# Patient Record
Sex: FEMALE | Race: WHITE | NOT HISPANIC OR LATINO | URBAN - NONMETROPOLITAN AREA
[De-identification: names, ages, dates, MRNs, and addresses within clinical notes are randomized per-mention and may not be internally consistent; named-entity substitution may affect disease eponyms.]

---

## 2019-06-05 ENCOUNTER — IMPORTED ENCOUNTER (OUTPATIENT)
Dept: URBAN - NONMETROPOLITAN AREA CLINIC 1 | Facility: CLINIC | Age: 74
End: 2019-06-05

## 2019-06-05 PROCEDURE — 99213 OFFICE O/P EST LOW 20 MIN: CPT

## 2019-06-05 PROCEDURE — 92134 CPTRZ OPH DX IMG PST SGM RTA: CPT

## 2019-06-05 NOTE — PATIENT DISCUSSION
ARMD OU-  discussed findings w/patient-  start PreserVision AREDS 2 PO daily-  continue AG use daily Patient to call or come in if changes in vision are noted from today-  OCT Mac done today OD&OS: hard drusen and RPE changes-  monitor 6 month Complete w/ Fundus PhotosType II DM w/o Retinopathy (Dx 2013)-  discussed findings w/patient-  no retinopathy noted at this time-  condition is controlled with oral medication-  last A1C 5.7 this year -  reviewed the importance of good blood sugar control and the negative effects of poorly controlled sugars on ocular health-  will send notes from today to Dr. Shine Copeland-  monitor 6 months or prn Pseudophakia w/ PCO OU-  discussed findings w/patient-  PCO note OU no treatment indicated -  monitor 6 months or prn ABILIO OU-  discussed findings w/patient-  continue tears PRN OU -  monitor yearly or prn; Michaela Notes: steroid responderMRDFE 6/5/2019OCT Mac 6/5/2019

## 2019-09-10 PROBLEM — H26.492: Noted: 2019-06-05

## 2019-09-10 PROBLEM — H35.3131: Noted: 2019-06-05

## 2019-09-10 PROBLEM — Z96.1: Noted: 2019-06-05

## 2019-09-10 PROBLEM — H16.223: Noted: 2019-09-10

## 2019-09-10 PROBLEM — E11.9: Noted: 2019-06-05

## 2019-09-10 PROBLEM — H52.223: Noted: 2019-06-05

## 2020-01-15 ENCOUNTER — IMPORTED ENCOUNTER (OUTPATIENT)
Dept: URBAN - NONMETROPOLITAN AREA CLINIC 1 | Facility: CLINIC | Age: 75
End: 2020-01-15

## 2020-01-15 PROCEDURE — 92014 COMPRE OPH EXAM EST PT 1/>: CPT

## 2020-01-15 PROCEDURE — 92015 DETERMINE REFRACTIVE STATE: CPT

## 2020-01-15 PROCEDURE — 92134 CPTRZ OPH DX IMG PST SGM RTA: CPT

## 2020-01-15 NOTE — PATIENT DISCUSSION
Mild ARMD OU dry -  discussed findings w/patient-  start PreserVision AREDS 2 PO daily-  continue AG use daily Patient to call or come in if changes in vision are noted from today-  UV protection recommended-  recommend leafy green vegetables such as kale spinach collards-  OCT Mac done today 1/15/2020: OD&OS: hard drusen sparse soft drusen and RPE changes.  Stable from previous-  monitor 6 month f/u ARMD w/ OCT Mac Type II DM w/o Retinopathy (Dx 2005)-  discussed findings w/patient-  no retinopathy noted at this time stable -  condition is controlled with oral medication-  last A1C 6.0% 10/2019.-  reviewed the importance of good blood sugar control and the negative effects of poorly controlled sugars on ocular health-  will send notes from today to Dr. Daniel Blanco-  monitor 6 month f/u Pseudophakia w/ PCO OU-  discussed findings w/patient-  PCO note OU no treatment indicated -  monitor 6 months or prn ABILIO OU-  discussed findings w/patient-  continue tears PRN OU -  monitor yearly or prn Mixed Astigmatism OU w/Presbyopia-  discussed findings w/patient-  new spectacle Rx issued-  monitor yearly or prn; 's Notes: steroid responderMR 1/15/2020DFE 1/15/2020OCT Mac 1/15/2020

## 2020-07-22 ENCOUNTER — IMPORTED ENCOUNTER (OUTPATIENT)
Dept: URBAN - NONMETROPOLITAN AREA CLINIC 1 | Facility: CLINIC | Age: 75
End: 2020-07-22

## 2020-07-22 PROCEDURE — 92134 CPTRZ OPH DX IMG PST SGM RTA: CPT

## 2020-07-22 PROCEDURE — 99213 OFFICE O/P EST LOW 20 MIN: CPT

## 2020-07-22 NOTE — PATIENT DISCUSSION
Mild ARMD OU dry -  discussed findings w/patient-  start PreserVision AREDS 2 PO daily-  continue AG use daily Patient to call or come in if changes in vision are noted from today-  UV protection recommended-  recommend leafy green vegetables such as kale spinach collards-  OCT Mac done today 7/22/2020: OD&OS: hard drusen sparse soft drusen and RPE changes.  Stable from previous-  monitor 6 month f/u ARMD w/ OCT Mac Type II DM w/o Retinopathy (Dx 2005)-  discussed findings w/patient-  no retinopathy noted at this time stable -  condition is controlled with oral medication-  last A1C 6.0% 10/2019.-  reviewed the importance of good blood sugar control and the negative effects of poorly controlled sugars on ocular health-  will send notes from today to Dr. Salome Lopez-  monitor 6 month f/u Pseudophakia w/ PCO OU-  discussed findings w/patient-  PCO note OU no treatment indicated -  monitor 6 months or prn ABILIO OU-  discussed findings w/patient-  continue tears PRN OU -  monitor yearly or prn; Michaela Notes: steroid responderMR 1/15/2020DFE defer 7/22/2020OCT Mac 7/22/2020

## 2021-01-20 ENCOUNTER — IMPORTED ENCOUNTER (OUTPATIENT)
Dept: URBAN - NONMETROPOLITAN AREA CLINIC 1 | Facility: CLINIC | Age: 76
End: 2021-01-20

## 2021-01-20 PROCEDURE — 92134 CPTRZ OPH DX IMG PST SGM RTA: CPT

## 2021-01-20 PROCEDURE — 92015 DETERMINE REFRACTIVE STATE: CPT

## 2021-01-20 PROCEDURE — 92014 COMPRE OPH EXAM EST PT 1/>: CPT

## 2021-01-20 NOTE — PATIENT DISCUSSION
Mild ARMD OU dry -  discussed findings w/patient-  start PreserVision AREDS 2 PO daily-  continue AG use daily Patient to call or come in if changes in vision are noted from today-  UV protection recommended-  recommend leafy green vegetables such as kale spinach collards-  OCT Mac done 1/20/2021:     OD&OS: mild hard drusen sparse soft drusen and RPE changes.  Stable from     previous-  monitor 6 month f/u ARMD w/OCT Mac Type II DM w/o Retinopathy (Dx 2005)-  discussed findings w/patient-  no retinopathy noted at this time stable -  condition is controlled with oral medication-  last A1C 6.0% Decmeber 2020-  BS not checked daily-  reviewed the importance of good blood sugar control and the negative effects of poorly controlled sugars on ocular health-  will send notes from today to Dr. Monta Gilford-  monitor 6 month f/u Pseudophakia w/ PCO OU-  discussed findings w/patient-  PCO note OU no treatment indicated -  monitor 6 months or prn ABILIO OU-  discussed findings w/patient-  no changes noted at this time-  continue tears PRN OU -  monitor yearly or prn Mixed Astigmatism OU w/Presbyopia-  discussed findings w/patient-  new spectacle Rx issued-  continue to monitor yearly or prn; 's Notes: steroid responderMR 1/20/2021DFE defer 1/20/2021OCT Mac 1/20/2021

## 2021-02-03 ENCOUNTER — IMPORTED ENCOUNTER (OUTPATIENT)
Dept: URBAN - NONMETROPOLITAN AREA CLINIC 1 | Facility: CLINIC | Age: 76
End: 2021-02-03

## 2021-02-03 NOTE — PATIENT DISCUSSION
Mixed Astigmatism OU w/Presbyopia-  discussed findings w/patient-  new spectacle Rx issued-  refraction done by Sully Harris today-  continue to monitor as scheduled or prnb; 's Notes: steroid responderMR 2/3/2021DFE defer 1/20/2021OCT Mac 1/20/2021

## 2021-05-21 ENCOUNTER — PREPPED CHART (OUTPATIENT)
Dept: URBAN - NONMETROPOLITAN AREA CLINIC 4 | Facility: CLINIC | Age: 76
End: 2021-05-21

## 2021-05-21 ENCOUNTER — IMPORTED ENCOUNTER (OUTPATIENT)
Dept: URBAN - NONMETROPOLITAN AREA CLINIC 1 | Facility: CLINIC | Age: 76
End: 2021-05-21

## 2021-05-21 PROBLEM — Z96.1: Noted: 2021-05-21

## 2021-05-21 PROBLEM — H35.3131: Noted: 2021-05-21

## 2021-05-21 PROBLEM — H00.022: Noted: 2021-05-21

## 2021-05-21 PROBLEM — H16.223: Noted: 2021-05-21

## 2021-05-21 PROBLEM — H26.492: Noted: 2021-05-21

## 2021-05-21 PROBLEM — E11.9: Noted: 2021-05-21

## 2021-05-21 PROCEDURE — 99213 OFFICE O/P EST LOW 20 MIN: CPT

## 2021-05-21 NOTE — PATIENT DISCUSSION
Karma Winter RLL-  discussed findings w/patient-  patient can not take steroid drops-  continue to monitor as scheduled or prn; 's Notes: steroid responderMR 2/3/2021DFE defer 1/20/2021OCT Mac 1/20/2021

## 2022-02-07 ASSESSMENT — VISUAL ACUITY
OS_CC: 20/25
OU_CC: 20/20
OD_CC: 20/25

## 2022-02-10 ENCOUNTER — FOLLOW UP (OUTPATIENT)
Dept: URBAN - NONMETROPOLITAN AREA CLINIC 4 | Facility: CLINIC | Age: 77
End: 2022-02-10

## 2022-02-10 DIAGNOSIS — E11.9: ICD-10-CM

## 2022-02-10 DIAGNOSIS — H04.123: ICD-10-CM

## 2022-02-10 DIAGNOSIS — H35.3131: ICD-10-CM

## 2022-02-10 PROCEDURE — 92134 CPTRZ OPH DX IMG PST SGM RTA: CPT

## 2022-02-10 PROCEDURE — 99214 OFFICE O/P EST MOD 30 MIN: CPT

## 2022-02-10 ASSESSMENT — VISUAL ACUITY
OU_CC: J1+
OS_CC: 20/20
OU_CC: 20/20-1
OD_CC: 20/20-1

## 2022-02-10 ASSESSMENT — TONOMETRY
OS_IOP_MMHG: 15
OD_IOP_MMHG: 14

## 2022-03-01 ENCOUNTER — FOLLOW UP (OUTPATIENT)
Dept: URBAN - NONMETROPOLITAN AREA CLINIC 4 | Facility: CLINIC | Age: 77
End: 2022-03-01

## 2022-03-01 DIAGNOSIS — H52.03: ICD-10-CM

## 2022-03-01 PROCEDURE — 92015 DETERMINE REFRACTIVE STATE: CPT

## 2022-03-01 ASSESSMENT — VISUAL ACUITY
OD_SC: 20/50
OS_SC: 20/25-2
OS_SC: 20/50
OD_SC: 20/30-1

## 2022-04-09 ASSESSMENT — VISUAL ACUITY
OS_SC: 20/25
OU_SC: 20/20-1
OS_SC: 20/25-2
OD_SC: 20/25-2
OU_SC: 20/25
OD_PH: 20/30
OS_CC: 20/25-
OD_SC: 20/25
OD_SC: 20/25-2
OS_SC: 20/25-1
OD_SC: 20/40
OS_SC: 20/25
OU_CC: 20/20
OU_CC: 20/20
OD_CC: 20/30-

## 2022-04-09 ASSESSMENT — TONOMETRY
OD_IOP_MMHG: 15
OD_IOP_MMHG: 15
OS_IOP_MMHG: 15
OS_IOP_MMHG: 13
OS_IOP_MMHG: 14
OS_IOP_MMHG: 16
OS_IOP_MMHG: 15
OD_IOP_MMHG: 15
OD_IOP_MMHG: 14
OD_IOP_MMHG: 13

## 2025-01-16 ENCOUNTER — COMPREHENSIVE EXAM (OUTPATIENT)
Age: 80
End: 2025-01-16

## 2025-01-16 DIAGNOSIS — H35.3132: ICD-10-CM

## 2025-01-16 DIAGNOSIS — H04.123: ICD-10-CM

## 2025-01-16 DIAGNOSIS — H40.053: ICD-10-CM

## 2025-01-16 DIAGNOSIS — E11.9: ICD-10-CM

## 2025-01-16 DIAGNOSIS — H26.493: ICD-10-CM

## 2025-01-16 PROCEDURE — 92134 CPTRZ OPH DX IMG PST SGM RTA: CPT

## 2025-01-16 PROCEDURE — 99214 OFFICE O/P EST MOD 30 MIN: CPT

## 2025-04-15 ENCOUNTER — FOLLOW UP (OUTPATIENT)
Age: 80
End: 2025-04-15

## 2025-04-15 DIAGNOSIS — H26.493: ICD-10-CM

## 2025-04-15 DIAGNOSIS — E11.9: ICD-10-CM

## 2025-04-15 DIAGNOSIS — H04.123: ICD-10-CM

## 2025-04-15 DIAGNOSIS — H40.053: ICD-10-CM

## 2025-04-15 DIAGNOSIS — H35.3132: ICD-10-CM

## 2025-04-15 PROCEDURE — 76514 ECHO EXAM OF EYE THICKNESS: CPT

## 2025-04-15 PROCEDURE — 99214 OFFICE O/P EST MOD 30 MIN: CPT

## 2025-04-15 PROCEDURE — 92083 EXTENDED VISUAL FIELD XM: CPT
